# Patient Record
Sex: FEMALE | Race: BLACK OR AFRICAN AMERICAN | NOT HISPANIC OR LATINO
[De-identification: names, ages, dates, MRNs, and addresses within clinical notes are randomized per-mention and may not be internally consistent; named-entity substitution may affect disease eponyms.]

---

## 2017-04-24 ENCOUNTER — RX RENEWAL (OUTPATIENT)
Age: 37
End: 2017-04-24

## 2020-06-02 ENCOUNTER — APPOINTMENT (OUTPATIENT)
Dept: OBGYN | Facility: CLINIC | Age: 40
End: 2020-06-02
Payer: COMMERCIAL

## 2020-06-02 ENCOUNTER — ASOB RESULT (OUTPATIENT)
Age: 40
End: 2020-06-02

## 2020-06-02 VITALS
HEART RATE: 89 BPM | WEIGHT: 163 LBS | SYSTOLIC BLOOD PRESSURE: 124 MMHG | TEMPERATURE: 98.4 F | BODY MASS INDEX: 25.58 KG/M2 | DIASTOLIC BLOOD PRESSURE: 75 MMHG | HEIGHT: 67 IN

## 2020-06-02 DIAGNOSIS — O09.529 SUPERVISION OF ELDERLY MULTIGRAVIDA, UNSPECIFIED TRIMESTER: ICD-10-CM

## 2020-06-02 DIAGNOSIS — Z32.01 ENCOUNTER FOR PREGNANCY TEST, RESULT POSITIVE: ICD-10-CM

## 2020-06-02 DIAGNOSIS — O34.31 MATERNAL CARE FOR CERVICAL INCOMPETENCE, FIRST TRIMESTER: ICD-10-CM

## 2020-06-02 PROCEDURE — 99203 OFFICE O/P NEW LOW 30 MIN: CPT | Mod: 25

## 2020-06-02 PROCEDURE — 76817 TRANSVAGINAL US OBSTETRIC: CPT

## 2020-06-02 PROCEDURE — 99385 PREV VISIT NEW AGE 18-39: CPT

## 2020-06-02 NOTE — PHYSICAL EXAM
[Awake] : awake [Alert] : alert [Soft] : soft [Oriented x3] : oriented to person, place, and time [Normal] : cervix [No Bleeding] : there was no active vaginal bleeding [Uterine Adnexae] : were not tender and not enlarged [Enlarged ___ wks] : enlarged [unfilled] ~Uweeks [Normal Position] : in a normal position [Acute Distress] : no acute distress [Mass] : no breast mass [Nipple Discharge] : no nipple discharge [Axillary LAD] : no axillary lymphadenopathy [Tender] : non tender

## 2020-06-02 NOTE — CHIEF COMPLAINT
[Annual Visit] : annual visit [FreeTextEntry1] : 40 y.o. P   LNMP unsure approx 3/31/20 presents for annual exam. pt c/o daily nausea. PMH - negative, PSHx- negative, FH- Diabetes - both parents. HTN- father, Breast ca- father, SH - negative. OB Hx -  x 2 FT cervical insufficiency requiring cerclage placement, GYN hx - ETOP x 3. uncomplicated.

## 2020-06-03 ENCOUNTER — APPOINTMENT (OUTPATIENT)
Dept: OBGYN | Facility: CLINIC | Age: 40
End: 2020-06-03
Payer: COMMERCIAL

## 2020-06-03 DIAGNOSIS — Z64.0 PROBLEMS RELATED TO UNWANTED PREGNANCY: ICD-10-CM

## 2020-06-03 LAB
ABO + RH PNL BLD: NORMAL
BASOPHILS # BLD AUTO: 0.04 K/UL
BASOPHILS NFR BLD AUTO: 0.4 %
BLD GP AB SCN SERPL QL: NORMAL
C TRACH RRNA SPEC QL NAA+PROBE: NOT DETECTED
EOSINOPHIL # BLD AUTO: 0.08 K/UL
EOSINOPHIL NFR BLD AUTO: 0.9 %
HBV SURFACE AG SER QL: NONREACTIVE
HCT VFR BLD CALC: 38.5 %
HCV AB SER QL: NONREACTIVE
HCV S/CO RATIO: 0.23 S/CO
HGB BLD-MCNC: 12.5 G/DL
HIV1+2 AB SPEC QL IA.RAPID: NONREACTIVE
HPV HIGH+LOW RISK DNA PNL CVX: NOT DETECTED
IMM GRANULOCYTES NFR BLD AUTO: 0.5 %
LYMPHOCYTES # BLD AUTO: 1.81 K/UL
LYMPHOCYTES NFR BLD AUTO: 19.3 %
MAN DIFF?: NORMAL
MCHC RBC-ENTMCNC: 32.2 PG
MCHC RBC-ENTMCNC: 32.5 GM/DL
MCV RBC AUTO: 99.2 FL
MEV IGG FLD QL IA: >300 AU/ML
MEV IGG+IGM SER-IMP: POSITIVE
MONOCYTES # BLD AUTO: 0.69 K/UL
MONOCYTES NFR BLD AUTO: 7.4 %
N GONORRHOEA RRNA SPEC QL NAA+PROBE: NOT DETECTED
NEUTROPHILS # BLD AUTO: 6.69 K/UL
NEUTROPHILS NFR BLD AUTO: 71.5 %
PLATELET # BLD AUTO: 355 K/UL
RBC # BLD: 3.88 M/UL
RBC # FLD: 12.4 %
RUBV IGG FLD-ACNC: 4.1 INDEX
RUBV IGG SER-IMP: POSITIVE
SOURCE AMPLIFICATION: NORMAL
T PALLIDUM AB SER QL IA: NEGATIVE
VZV AB TITR SER: POSITIVE
VZV IGG SER IF-ACNC: 3544 INDEX
WBC # FLD AUTO: 9.36 K/UL

## 2020-06-03 PROCEDURE — 99213 OFFICE O/P EST LOW 20 MIN: CPT | Mod: 95

## 2020-06-03 NOTE — CHIEF COMPLAINT
[FreeTextEntry1] : Given the extraordinary circumstances surrounding the COVID-19 pandemic with [place] as the epicenter and both the [facility] and [facility] guidelines protecting individuals & the community, I called the patient for a telemedicine consultation in lieu of the scheduled in-person visit. The patient gave verbal consent to this billable encounter and agreed to be financially responsible for any co-payment, co-insurance and/or deductible. She understands that this video visit is offered for her convenience and that she is able to cancel and reschedule for an in-person appointment if desired. She also acknowledged that sensitive medical information may be discussed during this video visit and that it is her responsibility to locate herself in a location that ensures privacy to her level of comfort. All current medical problems, medications and allergies were reviewed. The patient understands the plan and is in agreement. All questions were answered. All additional follow up visits will be scheduled taking into consideration ongoing COVID-19 precautions when appropriate. This was a telehealth service rendered by [employer].\par   [Follow Up] : follow up GYN visit

## 2020-06-03 NOTE — COUNSELING
[FreeTextEntry2] : D+C Counseling\par \par Options for the pregnancy were discussed with the patient, including continuation of pregnancy, labor induction, and dilation and evacuation (D&C). Given the pregnancy is undesired the patient would prefer not to continue the pregnancy.  She does not desire a labor induction and are requesting a D&E.  Patient aware specimen does not come out intact.\par Risks of D&C including:\par 1.	Infection: Patient counseled on risk of infection and the use of prophylactic antibiotics, signs/symptoms of pre- and post-operative infection were reviewed. \par 2.	Hemorrhage: possibly requiring blood (and/or blood products) transfusion, management including use of uterotonic medications, possible use of uterine balloon tamponade, possible use of interventional radiology uterine artery embolization, and possible hysterectomy.  If patient has had prior surgeries, their risk of injury to abdominopelvic organs associated with hysterectomy increases.\par 3.	Trauma: to vagina, cervix or uterus, possibly requiring laparoscopy, laparotomy, abdomino-pelvic organ repair and/or removal of affected organ(s).  This includes possible hysterectomy and was reviewed in detail.  The patient was counseled on the small risk of uterine perforation and risk of injury to vagina, cervix, uterus, rectum, bowel (small and large intestine), bladder, ureters, pelvic nerves and blood vessels.  \par \par The patient was also counseled on the small risk of the need for further surgery and of the risk, as with any surgical procedure, of death.\par \par The risk of harm to subsequent pregnancies or the ability to carry a subsequent pregnancy to term, and adverse psychological effects were discussed.  \par \par The patient does have underlying medical conditions that would increase her risks associated with the procedure. These conditions are: prior  section. The additional risks of the procedure are: increased risk of trauma and hemorrhage.\par \par The patient also understands it is their responsibility to bring to the attention of their physician any unusual symptoms following the  and to report to follow-up examinations.  \par \par They are sure of their decision and deny any coercion from family, friends or healthcare providers. The patient had the opportunity

## 2020-06-05 ENCOUNTER — OUTPATIENT (OUTPATIENT)
Dept: OUTPATIENT SERVICES | Facility: HOSPITAL | Age: 40
LOS: 1 days | End: 2020-06-05

## 2020-06-05 VITALS
HEART RATE: 95 BPM | DIASTOLIC BLOOD PRESSURE: 70 MMHG | OXYGEN SATURATION: 98 % | WEIGHT: 158.95 LBS | TEMPERATURE: 98 F | HEIGHT: 66.5 IN | RESPIRATION RATE: 16 BRPM | SYSTOLIC BLOOD PRESSURE: 116 MMHG

## 2020-06-05 DIAGNOSIS — Z64.0 PROBLEMS RELATED TO UNWANTED PREGNANCY: ICD-10-CM

## 2020-06-05 DIAGNOSIS — Z98.89 OTHER SPECIFIED POSTPROCEDURAL STATES: Chronic | ICD-10-CM

## 2020-06-05 DIAGNOSIS — O03.9 COMPLETE OR UNSPECIFIED SPONTANEOUS ABORTION WITHOUT COMPLICATION: Chronic | ICD-10-CM

## 2020-06-05 DIAGNOSIS — O34.40 MATERNAL CARE FOR OTHER ABNORMALITIES OF CERVIX, UNSPECIFIED TRIMESTER: Chronic | ICD-10-CM

## 2020-06-05 LAB
B19V IGG SER QL IA: 6.6 INDEX
B19V IGG+IGM SER-IMP: NORMAL
B19V IGG+IGM SER-IMP: POSITIVE
B19V IGM FLD-ACNC: 0.2 INDEX
B19V IGM SER-ACNC: NEGATIVE
BACTERIA UR CULT: NORMAL
BLD GP AB SCN SERPL QL: NEGATIVE — SIGNIFICANT CHANGE UP
CYTOLOGY CVX/VAG DOC THIN PREP: ABNORMAL
HCT VFR BLD CALC: 38.2 % — SIGNIFICANT CHANGE UP (ref 34.5–45)
HGB A MFR BLD: 96.8 %
HGB A2 MFR BLD: 1.3 %
HGB BLD-MCNC: 12.3 G/DL — SIGNIFICANT CHANGE UP (ref 11.5–15.5)
HGB F MFR BLD: 0.8 %
HGB FRACT BLD-IMP: NORMAL
HGB OTHER MFR BLD ELPH: 1.1 %
MCHC RBC-ENTMCNC: 30.8 PG — SIGNIFICANT CHANGE UP (ref 27–34)
MCHC RBC-ENTMCNC: 32.2 % — SIGNIFICANT CHANGE UP (ref 32–36)
MCV RBC AUTO: 95.5 FL — SIGNIFICANT CHANGE UP (ref 80–100)
NRBC # FLD: 0 K/UL — SIGNIFICANT CHANGE UP (ref 0–0)
PLATELET # BLD AUTO: 361 K/UL — SIGNIFICANT CHANGE UP (ref 150–400)
PMV BLD: 9.4 FL — SIGNIFICANT CHANGE UP (ref 7–13)
RBC # BLD: 4 M/UL — SIGNIFICANT CHANGE UP (ref 3.8–5.2)
RBC # FLD: 12 % — SIGNIFICANT CHANGE UP (ref 10.3–14.5)
RH IG SCN BLD-IMP: POSITIVE — SIGNIFICANT CHANGE UP
WBC # BLD: 9.18 K/UL — SIGNIFICANT CHANGE UP (ref 3.8–10.5)
WBC # FLD AUTO: 9.18 K/UL — SIGNIFICANT CHANGE UP (ref 3.8–10.5)

## 2020-06-05 RX ORDER — SODIUM CHLORIDE 9 MG/ML
3 INJECTION INTRAMUSCULAR; INTRAVENOUS; SUBCUTANEOUS EVERY 8 HOURS
Refills: 0 | Status: DISCONTINUED | OUTPATIENT
Start: 2020-06-09 | End: 2020-06-24

## 2020-06-05 RX ORDER — SODIUM CHLORIDE 9 MG/ML
1000 INJECTION, SOLUTION INTRAVENOUS
Refills: 0 | Status: DISCONTINUED | OUTPATIENT
Start: 2020-06-09 | End: 2020-06-24

## 2020-06-05 NOTE — H&P PST ADULT - NEGATIVE CARDIOVASCULAR SYMPTOMS
no claudication/no orthopnea/no paroxysmal nocturnal dyspnea/no palpitations/no peripheral edema/no dyspnea on exertion/no chest pain

## 2020-06-05 NOTE — H&P PST ADULT - NSICDXPASTMEDICALHX_GEN_ALL_CORE_FT
PAST MEDICAL HISTORY:  Maternal care for other abnormalities of cervix     Problems related to unwanted pregnancy

## 2020-06-05 NOTE — H&P PST ADULT - NSANTHOSAYNRD_GEN_A_CORE
No. ELOINA screening performed.  STOP BANG Legend: 0-2 = LOW Risk; 3-4 = INTERMEDIATE Risk; 5-8 = HIGH Risk

## 2020-06-05 NOTE — H&P PST ADULT - NSICDXPROBLEM_GEN_ALL_CORE_FT
PROBLEM DIAGNOSES  Problem: Problems related to unwanted pregnancy  Assessment and Plan: preop for dilation vacuum curettage on 6/9/2020  preop instructions given, pt verbalized understanding   GI prophylaxis provided

## 2020-06-05 NOTE — H&P PST ADULT - NSICDXFAMILYHX_GEN_ALL_CORE_FT
FAMILY HISTORY:  Family history of diabetes mellitus  Family history of diabetes mellitus in father  Family history of hypertension in father

## 2020-06-05 NOTE — H&P PST ADULT - NSICDXPASTSURGICALHX_GEN_ALL_CORE_FT
PAST SURGICAL HISTORY:   x 2    H/O LEEP (loop electrosurgical excision procedure) of cervix complicating pregnancy     History of cervical cerclage 2008

## 2020-06-05 NOTE — H&P PST ADULT - HISTORY OF PRESENT ILLNESS
39 y/o female presents to PST preop for dilation vacuum curettage on 6/9/2020. preop dx of problems related to unwanted pregnancy.

## 2020-06-06 ENCOUNTER — APPOINTMENT (OUTPATIENT)
Dept: DISASTER EMERGENCY | Facility: CLINIC | Age: 40
End: 2020-06-06

## 2020-06-06 DIAGNOSIS — Z01.818 ENCOUNTER FOR OTHER PREPROCEDURAL EXAMINATION: ICD-10-CM

## 2020-06-06 LAB
MEV IGM SER QL: NEGATIVE
SARS-COV-2 N GENE NPH QL NAA+PROBE: NOT DETECTED

## 2020-06-08 ENCOUNTER — TRANSCRIPTION ENCOUNTER (OUTPATIENT)
Age: 40
End: 2020-06-08

## 2020-06-08 LAB
AR GENE MUT ANL BLD/T: NEGATIVE
CFTR MUT TESTED BLD/T: NEGATIVE

## 2020-06-08 NOTE — ASU PATIENT PROFILE, ADULT - PSH
x 2  H/O LEEP (loop electrosurgical excision procedure) of cervix complicating pregnancy    History of cervical cerclage  2008

## 2020-06-09 ENCOUNTER — APPOINTMENT (OUTPATIENT)
Dept: OBGYN | Facility: HOSPITAL | Age: 40
End: 2020-06-09

## 2020-06-09 ENCOUNTER — OUTPATIENT (OUTPATIENT)
Dept: OUTPATIENT SERVICES | Facility: HOSPITAL | Age: 40
LOS: 1 days | Discharge: HOME | End: 2020-06-09
Payer: COMMERCIAL

## 2020-06-09 ENCOUNTER — RESULT REVIEW (OUTPATIENT)
Age: 40
End: 2020-06-09

## 2020-06-09 VITALS
TEMPERATURE: 98 F | SYSTOLIC BLOOD PRESSURE: 120 MMHG | RESPIRATION RATE: 14 BRPM | OXYGEN SATURATION: 100 % | HEART RATE: 74 BPM | WEIGHT: 158.95 LBS | HEIGHT: 66.5 IN | DIASTOLIC BLOOD PRESSURE: 76 MMHG

## 2020-06-09 VITALS
TEMPERATURE: 99 F | OXYGEN SATURATION: 98 % | RESPIRATION RATE: 16 BRPM | HEART RATE: 73 BPM | SYSTOLIC BLOOD PRESSURE: 115 MMHG | DIASTOLIC BLOOD PRESSURE: 66 MMHG

## 2020-06-09 DIAGNOSIS — O03.9 COMPLETE OR UNSPECIFIED SPONTANEOUS ABORTION WITHOUT COMPLICATION: Chronic | ICD-10-CM

## 2020-06-09 DIAGNOSIS — O34.40 MATERNAL CARE FOR OTHER ABNORMALITIES OF CERVIX, UNSPECIFIED TRIMESTER: Chronic | ICD-10-CM

## 2020-06-09 DIAGNOSIS — Z64.0 PROBLEMS RELATED TO UNWANTED PREGNANCY: ICD-10-CM

## 2020-06-09 DIAGNOSIS — Z98.89 OTHER SPECIFIED POSTPROCEDURAL STATES: Chronic | ICD-10-CM

## 2020-06-09 LAB
BLD GP AB SCN SERPL QL: NEGATIVE — SIGNIFICANT CHANGE UP
FMR1 GENE MUT ANL BLD/T: NORMAL
RH IG SCN BLD-IMP: POSITIVE — SIGNIFICANT CHANGE UP

## 2020-06-09 PROCEDURE — 59840 INDUCED ABORTION D&C: CPT | Mod: GC

## 2020-06-09 PROCEDURE — 88305 TISSUE EXAM BY PATHOLOGIST: CPT | Mod: 26

## 2020-06-09 RX ORDER — ONDANSETRON 8 MG/1
4 TABLET, FILM COATED ORAL ONCE
Refills: 0 | Status: DISCONTINUED | OUTPATIENT
Start: 2020-06-09 | End: 2020-06-24

## 2020-06-09 RX ORDER — SODIUM CHLORIDE 9 MG/ML
1000 INJECTION, SOLUTION INTRAVENOUS
Refills: 0 | Status: DISCONTINUED | OUTPATIENT
Start: 2020-06-09 | End: 2020-06-24

## 2020-06-09 RX ORDER — OXYCODONE HYDROCHLORIDE 5 MG/1
5 TABLET ORAL ONCE
Refills: 0 | Status: DISCONTINUED | OUTPATIENT
Start: 2020-06-09 | End: 2020-06-09

## 2020-06-09 RX ORDER — FENTANYL CITRATE 50 UG/ML
25 INJECTION INTRAVENOUS
Refills: 0 | Status: DISCONTINUED | OUTPATIENT
Start: 2020-06-09 | End: 2020-06-09

## 2020-06-09 NOTE — ASU DISCHARGE PLAN (ADULT/PEDIATRIC) - CALL YOUR DOCTOR IF YOU HAVE ANY OF THE FOLLOWING:
Fever greater than (need to indicate Fahrenheit or Celsius)/Unable to urinate/Bleeding that does not stop Fever greater than (need to indicate Fahrenheit or Celsius)/Unable to urinate/Bleeding that does not stop/Pain not relieved by Medications/Inability to tolerate liquids or foods

## 2020-06-09 NOTE — ASU DISCHARGE PLAN (ADULT/PEDIATRIC) - ACTIVITY LEVEL
No heavy lifting/Nothing per vagina/No tub baths/No tampons/No douching/No excercise/No intercourse/No sports/gym

## 2020-06-09 NOTE — ASU DISCHARGE PLAN (ADULT/PEDIATRIC) - CARE PROVIDER_API CALL
True Palomo  OBS-GYN - GENERAL  [address] [place] [address]  Phone: [phone]  Fax: [phone]  Established Patient  Follow Up Time: Routine

## 2020-06-09 NOTE — BRIEF OPERATIVE NOTE - OPERATION/FINDINGS
Uterus 10wks size gestation, grossly normal.  Cervix closed.  Dilated cervix to 10mm.  Size 10 curved suction curette used to remove products of conception.  Sharp curettage to ensure gritty texture.  Vicryl suture at cervical bite site.  Excellent hemostasis noted.

## 2020-06-11 LAB — SURGICAL PATHOLOGY STUDY: SIGNIFICANT CHANGE UP

## 2020-06-23 PROBLEM — Z64.0 PROBLEMS RELATED TO UNWANTED PREGNANCY: Chronic | Status: ACTIVE | Noted: 2020-06-05

## 2020-06-25 ENCOUNTER — APPOINTMENT (OUTPATIENT)
Dept: OBGYN | Facility: CLINIC | Age: 40
End: 2020-06-25
Payer: COMMERCIAL

## 2020-06-25 VITALS
SYSTOLIC BLOOD PRESSURE: 120 MMHG | BODY MASS INDEX: 25.27 KG/M2 | TEMPERATURE: 98.6 F | HEIGHT: 67 IN | WEIGHT: 161 LBS | DIASTOLIC BLOOD PRESSURE: 74 MMHG | HEART RATE: 83 BPM

## 2020-06-25 PROCEDURE — 99024 POSTOP FOLLOW-UP VISIT: CPT

## 2020-06-25 NOTE — HISTORY OF PRESENT ILLNESS
[0/10] : no pain reported [Fever] : no fever [Chills] : no chills [Vaginal Bleeding] : no vaginal bleeding [None] : no vaginal bleeding [Vaginal Discharge] : no vaginal discharge [Dysuria] : no dysuria [No Sign of Infection] : is showing no signs of infection [Normal] : the vagina was normal [Doing Well] : is doing well [de-identified] : junel for OCP, rto for annual exam

## 2020-12-15 ENCOUNTER — APPOINTMENT (OUTPATIENT)
Dept: OBGYN | Facility: CLINIC | Age: 40
End: 2020-12-15
Payer: COMMERCIAL

## 2020-12-15 VITALS
DIASTOLIC BLOOD PRESSURE: 78 MMHG | WEIGHT: 163 LBS | SYSTOLIC BLOOD PRESSURE: 150 MMHG | HEIGHT: 67 IN | BODY MASS INDEX: 25.58 KG/M2 | HEART RATE: 86 BPM | TEMPERATURE: 98.7 F

## 2020-12-15 PROCEDURE — 99072 ADDL SUPL MATRL&STAF TM PHE: CPT

## 2020-12-15 PROCEDURE — 99213 OFFICE O/P EST LOW 20 MIN: CPT

## 2020-12-15 NOTE — DISCUSSION/SUMMARY
[FreeTextEntry1] : A - BV\par \par P- Flagyl 500mg BID x 7 ( pt preference)\par     Affirmed and Gen probe done \par      f/u prn

## 2020-12-15 NOTE — PHYSICAL EXAM
[Labia Majora] : normal [Labia Minora] : normal [Discharge] : a  ~M vaginal discharge was present [Moderate] : moderate [Foul Smelling] : foul smelling [Jessica] : yellow [Frothy] : frothy [Normal] : normal

## 2020-12-15 NOTE — HISTORY OF PRESENT ILLNESS
[FreeTextEntry1] : pt presents for follow up LN 12/7/20. . pt is recently s/p ETOP, pt is now P 2042. pt today is concerned over vaginal discharge  x " a couple of days", stating that it is more than normal, pt  denies pruritis , but admits to slight odor,

## 2020-12-16 LAB
CANDIDA VAG CYTO: NOT DETECTED
G VAGINALIS+PREV SP MTYP VAG QL MICRO: DETECTED
T VAGINALIS VAG QL WET PREP: NOT DETECTED

## 2020-12-17 LAB
C TRACH RRNA SPEC QL NAA+PROBE: NOT DETECTED
N GONORRHOEA RRNA SPEC QL NAA+PROBE: NOT DETECTED
SOURCE AMPLIFICATION: NORMAL

## 2021-08-10 ENCOUNTER — RX RENEWAL (OUTPATIENT)
Age: 41
End: 2021-08-10

## 2021-09-10 ENCOUNTER — NON-APPOINTMENT (OUTPATIENT)
Age: 41
End: 2021-09-10

## 2021-10-01 ENCOUNTER — RX RENEWAL (OUTPATIENT)
Age: 41
End: 2021-10-01

## 2021-10-25 ENCOUNTER — RX RENEWAL (OUTPATIENT)
Age: 41
End: 2021-10-25

## 2021-11-15 ENCOUNTER — RX RENEWAL (OUTPATIENT)
Age: 41
End: 2021-11-15

## 2021-12-21 ENCOUNTER — APPOINTMENT (OUTPATIENT)
Dept: OBGYN | Facility: CLINIC | Age: 41
End: 2021-12-21
Payer: COMMERCIAL

## 2021-12-21 VITALS
BODY MASS INDEX: 26.53 KG/M2 | DIASTOLIC BLOOD PRESSURE: 98 MMHG | HEIGHT: 67 IN | HEART RATE: 87 BPM | WEIGHT: 169 LBS | SYSTOLIC BLOOD PRESSURE: 129 MMHG

## 2021-12-21 PROCEDURE — 99395 PREV VISIT EST AGE 18-39: CPT

## 2021-12-21 RX ORDER — NORETHINDRONE ACETATE AND ETHINYL ESTRADIOL 1; 20 MG/1; UG/1
1-20 TABLET ORAL
Qty: 63 | Refills: 3 | Status: COMPLETED | COMMUNITY
Start: 2020-06-25 | End: 2021-12-21

## 2021-12-21 NOTE — HISTORY OF PRESENT ILLNESS
[FreeTextEntry1] : 41 y.o. P 2042  [facility] Aug. 2021 presents for annual exam. pt feels well. PMH - negative, PSHx - negative, FH - Diabetes - mother, , HTN - father Breast ca - father, SH - negative, GYN hx ETOP x 4, OB hx - NSVDx 2, hx of cervical incompetence. requiring cerclage placement. JOSE MIGUEL Ying pt is a principal at [employer] , ( [employer] ).

## 2021-12-21 NOTE — DISCUSSION/SUMMARY
[FreeTextEntry1] : A - WWV\par    contraceptive management\par     first degree relative with breast cancer, \par \par p- f/u 1 year\par      exercise encouraged\par     nutritional counseling provided\par      pap next due in 2023\par      referral for mammo and breast sono given \par    refill on OCP's sent to pharmacy.

## 2022-02-11 ENCOUNTER — RX RENEWAL (OUTPATIENT)
Age: 42
End: 2022-02-11

## 2022-02-21 ENCOUNTER — RX RENEWAL (OUTPATIENT)
Age: 42
End: 2022-02-21

## 2022-02-25 ENCOUNTER — RX RENEWAL (OUTPATIENT)
Age: 42
End: 2022-02-25

## 2022-02-25 RX ORDER — METRONIDAZOLE 500 MG/1
500 TABLET ORAL TWICE DAILY
Qty: 14 | Refills: 0 | Status: ACTIVE | COMMUNITY
Start: 2020-12-15 | End: 1900-01-01

## 2022-10-28 ENCOUNTER — NON-APPOINTMENT (OUTPATIENT)
Age: 42
End: 2022-10-28

## 2023-08-07 NOTE — ASU PATIENT PROFILE, ADULT - TEACHING/LEARNING LEARNING PREFERENCES
Tried calling patient again but unable to reach or LVM due to mailbox not set up.    Second attempt   verbal instruction

## 2023-08-08 ENCOUNTER — APPOINTMENT (OUTPATIENT)
Dept: OBGYN | Facility: CLINIC | Age: 43
End: 2023-08-08
Payer: COMMERCIAL

## 2023-08-08 VITALS
BODY MASS INDEX: 26.26 KG/M2 | HEIGHT: 67 IN | HEART RATE: 80 BPM | DIASTOLIC BLOOD PRESSURE: 90 MMHG | WEIGHT: 167.3 LBS | SYSTOLIC BLOOD PRESSURE: 132 MMHG

## 2023-08-08 DIAGNOSIS — Z30.9 ENCOUNTER FOR CONTRACEPTIVE MANAGEMENT, UNSPECIFIED: ICD-10-CM

## 2023-08-08 DIAGNOSIS — Z01.411 ENCOUNTER FOR GYNECOLOGICAL EXAMINATION (GENERAL) (ROUTINE) WITH ABNORMAL FINDINGS: ICD-10-CM

## 2023-08-08 PROCEDURE — 99213 OFFICE O/P EST LOW 20 MIN: CPT | Mod: 25

## 2023-08-08 PROCEDURE — 99396 PREV VISIT EST AGE 40-64: CPT

## 2023-08-08 RX ORDER — NORETHINDRONE ACETATE AND ETHINYL ESTRADIOL 1; .02 MG/1; MG/1
1-20 TABLET ORAL DAILY
Qty: 4 | Refills: 0 | Status: COMPLETED | COMMUNITY
Start: 2021-08-10 | End: 2023-08-08

## 2023-08-08 RX ORDER — METRONIDAZOLE 500 MG/1
500 TABLET ORAL TWICE DAILY
Qty: 14 | Refills: 0 | Status: ACTIVE | COMMUNITY
Start: 2023-08-08 | End: 1900-01-01

## 2023-08-08 RX ORDER — NORETHINDRONE ACETATE AND ETHINYL ESTRADIOL 1; 20 MG/1; UG/1
1-20 TABLET ORAL DAILY
Qty: 3 | Refills: 2 | Status: COMPLETED | COMMUNITY
Start: 2021-12-21 | End: 2023-08-08

## 2023-08-08 NOTE — DISCUSSION/SUMMARY
[FreeTextEntry1] : A - WWV       BV     contraceptive management  P- f/u 1 year or prn      pap done      Flagyl 500mg BID x 7, ( pt preference)      referral for baseline mammo and breast sono given      pt has enough Kariva,       exercise encouraged      nutritional counseling provided,     pt it traveling to [place] for family vacation 8/18   offered to come to her school for career day.

## 2023-08-08 NOTE — HISTORY OF PRESENT ILLNESS
[FreeTextEntry1] : 43 y.o. P    LN  23, presents for annual exam. pt feels well, ,  PMH - negative, ,  PSHx - negative,  FH - Diabetes- mother,  HTN- father,  Breast ca - father,  SH - negative,  GYN hx - ETOP x 4,  OB hx -  x 2,  hx of cervical incompetence,   pt is on OCP's,  ( Kariva) and is taking the pills continuously without taking the placebo, however , pt is planning to going back to taking the placebo pills. pt reports having a postmenstrual discharge after this most recent pack. ( pt did not continue with the active pills this month. ).  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

## 2023-08-21 LAB
CYTOLOGY CVX/VAG DOC THIN PREP: ABNORMAL
HPV HIGH+LOW RISK DNA PNL CVX: NOT DETECTED

## 2023-08-24 ENCOUNTER — APPOINTMENT (OUTPATIENT)
Dept: ULTRASOUND IMAGING | Facility: CLINIC | Age: 43
End: 2023-08-24
Payer: COMMERCIAL

## 2023-08-24 ENCOUNTER — RESULT REVIEW (OUTPATIENT)
Age: 43
End: 2023-08-24

## 2023-08-24 ENCOUNTER — APPOINTMENT (OUTPATIENT)
Dept: MAMMOGRAPHY | Facility: CLINIC | Age: 43
End: 2023-08-24
Payer: COMMERCIAL

## 2023-08-24 PROCEDURE — 76641 ULTRASOUND BREAST COMPLETE: CPT | Mod: 50

## 2023-08-24 PROCEDURE — 77067 SCR MAMMO BI INCL CAD: CPT

## 2023-08-24 PROCEDURE — 77063 BREAST TOMOSYNTHESIS BI: CPT

## 2023-10-10 ENCOUNTER — APPOINTMENT (OUTPATIENT)
Dept: OBGYN | Facility: CLINIC | Age: 43
End: 2023-10-10
Payer: COMMERCIAL

## 2023-10-10 VITALS
WEIGHT: 164 LBS | BODY MASS INDEX: 25.74 KG/M2 | DIASTOLIC BLOOD PRESSURE: 83 MMHG | HEIGHT: 67 IN | HEART RATE: 72 BPM | SYSTOLIC BLOOD PRESSURE: 133 MMHG

## 2023-10-10 DIAGNOSIS — Z30.430 ENCOUNTER FOR INSERTION OF INTRAUTERINE CONTRACEPTIVE DEVICE: ICD-10-CM

## 2023-10-10 LAB
HCG UR QL: NEGATIVE
QUALITY CONTROL: YES

## 2023-10-10 PROCEDURE — 58300 INSERT INTRAUTERINE DEVICE: CPT

## 2024-01-03 ENCOUNTER — APPOINTMENT (OUTPATIENT)
Dept: OBGYN | Facility: CLINIC | Age: 44
End: 2024-01-03
Payer: COMMERCIAL

## 2024-01-03 VITALS
WEIGHT: 162 LBS | HEART RATE: 71 BPM | DIASTOLIC BLOOD PRESSURE: 74 MMHG | BODY MASS INDEX: 25.43 KG/M2 | HEIGHT: 67 IN | SYSTOLIC BLOOD PRESSURE: 132 MMHG

## 2024-01-03 PROCEDURE — 99213 OFFICE O/P EST LOW 20 MIN: CPT

## 2024-01-03 NOTE — HISTORY OF PRESENT ILLNESS
[FreeTextEntry1] : pt presents  for follow up, LN , Dec. 2023, pt now has a Paragard IUD since Oct. 2023.  Today, pt is c/o vaginal discharge,  pt has hx of recurrent BV. ,

## 2024-01-03 NOTE — DISCUSSION/SUMMARY
[FreeTextEntry1] : A - BV  P- vaginitis plus and Affirmed done     Boric acid capsules sent  to pharmacy     f/u prn

## 2024-01-03 NOTE — PHYSICAL EXAM
[Labia Majora] : normal [Labia Minora] : normal [Discharge] : a  ~M vaginal discharge was present [Moderate] : moderate [Foul Smelling] : foul smelling [White] : white [Thin] : thin [Normal] : normal [IUD String] : an IUD string was noted

## 2024-01-09 LAB
A VAGINAE DNA VAG QL NAA+PROBE: ABNORMAL
BVAB2 DNA VAG QL NAA+PROBE: ABNORMAL
C KRUSEI DNA VAG QL NAA+PROBE: NEGATIVE
C TRACH RRNA SPEC QL NAA+PROBE: NEGATIVE
CANDIDA DNA VAG QL NAA+PROBE: NEGATIVE
MEGA1 DNA VAG QL NAA+PROBE: NORMAL
N GONORRHOEA RRNA SPEC QL NAA+PROBE: NEGATIVE
T VAGINALIS RRNA SPEC QL NAA+PROBE: NEGATIVE

## 2024-02-16 ENCOUNTER — APPOINTMENT (OUTPATIENT)
Dept: MAMMOGRAPHY | Facility: CLINIC | Age: 44
End: 2024-02-16

## 2024-02-16 ENCOUNTER — APPOINTMENT (OUTPATIENT)
Dept: ULTRASOUND IMAGING | Facility: CLINIC | Age: 44
End: 2024-02-16
Payer: COMMERCIAL

## 2024-02-16 PROCEDURE — 76641 ULTRASOUND BREAST COMPLETE: CPT | Mod: 50

## 2024-02-27 RX ORDER — METRONIDAZOLE 500 MG/1
500 TABLET ORAL TWICE DAILY
Qty: 14 | Refills: 0 | Status: ACTIVE | COMMUNITY
Start: 2024-02-27 | End: 1900-01-01

## 2024-04-16 ENCOUNTER — APPOINTMENT (OUTPATIENT)
Dept: OBGYN | Facility: CLINIC | Age: 44
End: 2024-04-16
Payer: COMMERCIAL

## 2024-04-16 VITALS
HEART RATE: 77 BPM | BODY MASS INDEX: 25.11 KG/M2 | SYSTOLIC BLOOD PRESSURE: 153 MMHG | HEIGHT: 67 IN | DIASTOLIC BLOOD PRESSURE: 84 MMHG | WEIGHT: 160 LBS

## 2024-04-16 DIAGNOSIS — N93.9 ABNORMAL UTERINE AND VAGINAL BLEEDING, UNSPECIFIED: ICD-10-CM

## 2024-04-16 DIAGNOSIS — N92.0 EXCESSIVE AND FREQUENT MENSTRUATION WITH REGULAR CYCLE: ICD-10-CM

## 2024-04-16 DIAGNOSIS — Z97.5 EXCESSIVE AND FREQUENT MENSTRUATION WITH REGULAR CYCLE: ICD-10-CM

## 2024-04-16 DIAGNOSIS — N76.0 ACUTE VAGINITIS: ICD-10-CM

## 2024-04-16 DIAGNOSIS — B96.89 ACUTE VAGINITIS: ICD-10-CM

## 2024-04-16 PROCEDURE — 99459 PELVIC EXAMINATION: CPT

## 2024-04-16 PROCEDURE — 99213 OFFICE O/P EST LOW 20 MIN: CPT

## 2024-04-16 RX ORDER — BORIC ACID
POWDER (GRAM) MISCELLANEOUS
Qty: 14 | Refills: 2 | Status: DISCONTINUED | COMMUNITY
Start: 2024-01-03 | End: 2024-04-16

## 2024-04-16 RX ORDER — METRONIDAZOLE 500 MG/1
500 TABLET ORAL TWICE DAILY
Qty: 14 | Refills: 0 | Status: ACTIVE | COMMUNITY
Start: 2024-04-16 | End: 1900-01-01

## 2024-04-16 NOTE — HISTORY OF PRESENT ILLNESS
[FreeTextEntry1] : pt presents for follow up, LNMP, 3/27/24 , ending 3/31/24,  with return of bleeding 4/8 which has continued until today. . pt has IUD, in situ  placed 10/10/23,

## 2024-04-16 NOTE — DISCUSSION/SUMMARY
[FreeTextEntry1] : A - AUB       Paragard IUD in situ      BV  P- f/u on Friday ( 3 days) for sono to check IUD location     Urine for GC/Chlamydia     Flagyl 500mg BID x 7.

## 2024-04-16 NOTE — PHYSICAL EXAM
[Chaperone Present] : A chaperone was present in the examining room during all aspects of the physical examination [[id number]] : A chaperone was present during the pelvic exam. [Labia Majora] : normal [Labia Minora] : normal [Scant] : There was scant vaginal bleeding [IUD String] : an IUD string was noted [Normal] : normal [Uterine Adnexae] : normal [FreeTextEntry2] : Vanessa Swartz

## 2024-04-22 ENCOUNTER — APPOINTMENT (OUTPATIENT)
Dept: OBGYN | Facility: CLINIC | Age: 44
End: 2024-04-22
Payer: COMMERCIAL

## 2024-04-22 PROCEDURE — 76830 TRANSVAGINAL US NON-OB: CPT

## 2024-05-30 NOTE — ASU PATIENT PROFILE, ADULT - VISION (WITH CORRECTIVE LENSES IF THE PATIENT USUALLY WEARS THEM):
Lm for patient to call back for results.     
axillary crutches
Normal vision: sees adequately in most situations; can see medication labels, newsprint

## 2024-09-17 ENCOUNTER — APPOINTMENT (OUTPATIENT)
Dept: OBGYN | Facility: CLINIC | Age: 44
End: 2024-09-17
Payer: COMMERCIAL

## 2024-09-17 VITALS
HEART RATE: 68 BPM | DIASTOLIC BLOOD PRESSURE: 83 MMHG | SYSTOLIC BLOOD PRESSURE: 131 MMHG | WEIGHT: 148 LBS | BODY MASS INDEX: 23.23 KG/M2 | HEIGHT: 67 IN

## 2024-09-17 DIAGNOSIS — N76.0 ACUTE VAGINITIS: ICD-10-CM

## 2024-09-17 DIAGNOSIS — B96.89 ACUTE VAGINITIS: ICD-10-CM

## 2024-09-17 PROCEDURE — 99213 OFFICE O/P EST LOW 20 MIN: CPT

## 2024-09-17 PROCEDURE — 99459 PELVIC EXAMINATION: CPT

## 2024-09-17 RX ORDER — METRONIDAZOLE 500 MG/1
500 TABLET ORAL TWICE DAILY
Qty: 14 | Refills: 0 | Status: ACTIVE | COMMUNITY
Start: 2024-09-17 | End: 1900-01-01

## 2024-09-17 NOTE — DISCUSSION/SUMMARY
[FreeTextEntry1] : A - BV  P- Flagyl 500mg BID x 7, ( pt preference)      Vaginitis panel done     f/u prn

## 2024-09-17 NOTE — HISTORY OF PRESENT ILLNESS
[FreeTextEntry1] : pt presents for follow up, LMP 9/8/24, pt c/o vaginal discharge and slight itch, made urgent appt. today to get it checked out.

## 2024-09-17 NOTE — PHYSICAL EXAM
[Chaperone Present] : A chaperone was present in the examining room during all aspects of the physical examination [[id number]] : A chaperone was present during the pelvic exam. [Labia Majora] : normal [Labia Minora] : normal [Discharge] : a  ~M vaginal discharge was present [Scant] : scant [Foul Smelling] : foul smelling [White] : white [Thin] : thin [Normal] : normal [IUD String] : an IUD string was noted [FreeTextEntry2] : Rachel

## 2024-09-18 LAB
BV BACTERIA RRNA VAG QL NAA+PROBE: DETECTED
C GLABRATA RNA VAG QL NAA+PROBE: NOT DETECTED
CANDIDA RRNA VAG QL PROBE: NOT DETECTED
T VAGINALIS RRNA SPEC QL NAA+PROBE: NOT DETECTED

## 2024-11-22 ENCOUNTER — APPOINTMENT (OUTPATIENT)
Dept: OBGYN | Facility: CLINIC | Age: 44
End: 2024-11-22
Payer: COMMERCIAL

## 2024-11-22 VITALS
SYSTOLIC BLOOD PRESSURE: 139 MMHG | HEIGHT: 67 IN | WEIGHT: 147 LBS | HEART RATE: 76 BPM | DIASTOLIC BLOOD PRESSURE: 83 MMHG | BODY MASS INDEX: 23.07 KG/M2

## 2024-11-22 DIAGNOSIS — N76.1 SUBACUTE AND CHRONIC VAGINITIS: ICD-10-CM

## 2024-11-22 PROCEDURE — 99459 PELVIC EXAMINATION: CPT

## 2024-11-22 PROCEDURE — 99213 OFFICE O/P EST LOW 20 MIN: CPT

## 2024-11-22 RX ORDER — METRONIDAZOLE 7.5 MG/G
0.75 GEL VAGINAL
Qty: 1 | Refills: 3 | Status: ACTIVE | COMMUNITY
Start: 2024-11-22 | End: 1900-01-01

## 2024-11-25 LAB
BV BACTERIA RRNA VAG QL NAA+PROBE: DETECTED
C GLABRATA RNA VAG QL NAA+PROBE: NOT DETECTED
C TRACH RRNA SPEC QL NAA+PROBE: NOT DETECTED
CANDIDA RRNA VAG QL PROBE: DETECTED
N GONORRHOEA RRNA SPEC QL NAA+PROBE: NOT DETECTED
T VAGINALIS RRNA SPEC QL NAA+PROBE: NOT DETECTED

## 2024-11-25 RX ORDER — METRONIDAZOLE 500 MG/1
500 TABLET ORAL TWICE DAILY
Qty: 14 | Refills: 0 | Status: ACTIVE | COMMUNITY
Start: 2024-11-25 | End: 1900-01-01